# Patient Record
Sex: MALE | Race: WHITE | Employment: STUDENT | URBAN - METROPOLITAN AREA
[De-identification: names, ages, dates, MRNs, and addresses within clinical notes are randomized per-mention and may not be internally consistent; named-entity substitution may affect disease eponyms.]

---

## 2017-11-21 ENCOUNTER — LAB ENCOUNTER (OUTPATIENT)
Dept: LAB | Age: 27
End: 2017-11-21
Attending: FAMILY MEDICINE
Payer: COMMERCIAL

## 2017-11-21 ENCOUNTER — OFFICE VISIT (OUTPATIENT)
Dept: FAMILY MEDICINE CLINIC | Facility: CLINIC | Age: 27
End: 2017-11-21

## 2017-11-21 VITALS
BODY MASS INDEX: 23.69 KG/M2 | TEMPERATURE: 99 F | WEIGHT: 165.5 LBS | RESPIRATION RATE: 12 BRPM | DIASTOLIC BLOOD PRESSURE: 73 MMHG | SYSTOLIC BLOOD PRESSURE: 120 MMHG | HEIGHT: 70.25 IN | HEART RATE: 79 BPM

## 2017-11-21 DIAGNOSIS — Z00.00 ADULT GENERAL MEDICAL EXAM: ICD-10-CM

## 2017-11-21 DIAGNOSIS — K21.9 GASTROESOPHAGEAL REFLUX DISEASE, ESOPHAGITIS PRESENCE NOT SPECIFIED: ICD-10-CM

## 2017-11-21 DIAGNOSIS — F11.11 HISTORY OF HEROIN ABUSE (HCC): ICD-10-CM

## 2017-11-21 DIAGNOSIS — R71.8 MICROCYTOSIS: ICD-10-CM

## 2017-11-21 DIAGNOSIS — Z00.00 ADULT GENERAL MEDICAL EXAM: Primary | ICD-10-CM

## 2017-11-21 PROCEDURE — 36415 COLL VENOUS BLD VENIPUNCTURE: CPT

## 2017-11-21 PROCEDURE — 84443 ASSAY THYROID STIM HORMONE: CPT

## 2017-11-21 PROCEDURE — 85060 BLOOD SMEAR INTERPRETATION: CPT

## 2017-11-21 PROCEDURE — 87491 CHLMYD TRACH DNA AMP PROBE: CPT

## 2017-11-21 PROCEDURE — 87340 HEPATITIS B SURFACE AG IA: CPT

## 2017-11-21 PROCEDURE — 86803 HEPATITIS C AB TEST: CPT

## 2017-11-21 PROCEDURE — 80053 COMPREHEN METABOLIC PANEL: CPT

## 2017-11-21 PROCEDURE — 80500 HEPATITIS A B + C PROFILE: CPT

## 2017-11-21 PROCEDURE — 87591 N.GONORRHOEAE DNA AMP PROB: CPT

## 2017-11-21 PROCEDURE — 99385 PREV VISIT NEW AGE 18-39: CPT | Performed by: FAMILY MEDICINE

## 2017-11-21 PROCEDURE — 86708 HEPATITIS A ANTIBODY: CPT

## 2017-11-21 PROCEDURE — 86706 HEP B SURFACE ANTIBODY: CPT

## 2017-11-21 PROCEDURE — 85025 COMPLETE CBC W/AUTO DIFF WBC: CPT

## 2017-11-21 PROCEDURE — 87389 HIV-1 AG W/HIV-1&-2 AB AG IA: CPT

## 2017-11-21 PROCEDURE — 86704 HEP B CORE ANTIBODY TOTAL: CPT

## 2017-11-21 RX ORDER — NICOTINE POLACRILEX 4 MG/1
20 GUM, CHEWING ORAL DAILY
Qty: 90 TABLET | Refills: 0 | Status: SHIPPED | OUTPATIENT
Start: 2017-11-21 | End: 2017-12-21

## 2017-11-21 NOTE — PROGRESS NOTES
Patient ID: Sharan Alvarez is a 23year old male. HPI  Patient presents with:  Routine Physical  I know his father. He is a patient of mine. Patient is single. He lives in a community in [place] that he moved to 2 years ago.   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 The patient is not nervous/anxious. History reviewed. No pertinent past medical history. History reviewed. No pertinent surgical history.       Social History  Social Hist Diagnoses and all orders for this visit:    Adult general medical exam  -     CBC WITH DIFFERENTIAL WITH PLATELET; Future  -     ASSAY, THYROID STIM HORMONE; Future  -     COMP METABOLIC PANEL (14); Future  Very pleasant gentleman.   I remember his fath

## 2017-11-22 ENCOUNTER — TELEPHONE (OUTPATIENT)
Dept: OTHER | Age: 27
End: 2017-11-22

## 2017-11-22 NOTE — TELEPHONE ENCOUNTER
----- Message from Marivel Serna DO sent at 11/22/2017 11:46 AM CST -----  HIV, hepatitis tests are all negative. Thyroid test is normal.  Your kidneys, liver, sugar are normal.  Your CBC shows that you have thalassemia most likely.   This is not uncommon